# Patient Record
Sex: FEMALE | Race: BLACK OR AFRICAN AMERICAN | Employment: FULL TIME | URBAN - METROPOLITAN AREA
[De-identification: names, ages, dates, MRNs, and addresses within clinical notes are randomized per-mention and may not be internally consistent; named-entity substitution may affect disease eponyms.]

---

## 2021-04-28 ENCOUNTER — HOSPITAL ENCOUNTER (EMERGENCY)
Age: 41
Discharge: HOME | End: 2021-04-28
Attending: EMERGENCY MEDICINE
Payer: COMMERCIAL

## 2021-04-28 VITALS
HEART RATE: 78 BPM | WEIGHT: 218 LBS | OXYGEN SATURATION: 99 % | SYSTOLIC BLOOD PRESSURE: 127 MMHG | TEMPERATURE: 97.6 F | BODY MASS INDEX: 41.16 KG/M2 | HEIGHT: 61 IN | DIASTOLIC BLOOD PRESSURE: 79 MMHG | RESPIRATION RATE: 17 BRPM

## 2021-04-28 DIAGNOSIS — M62.838 TRAPEZIUS MUSCLE SPASM: Primary | ICD-10-CM

## 2021-04-28 PROCEDURE — 99283 EMERGENCY DEPT VISIT LOW MDM: CPT

## 2021-04-28 PROCEDURE — 74011636637 HC RX REV CODE- 636/637: Performed by: EMERGENCY MEDICINE

## 2021-04-28 PROCEDURE — 74011250637 HC RX REV CODE- 250/637: Performed by: EMERGENCY MEDICINE

## 2021-04-28 RX ORDER — HYDROCODONE BITARTRATE AND ACETAMINOPHEN 5; 325 MG/1; MG/1
1 TABLET ORAL
Qty: 7 TAB | Refills: 0 | Status: SHIPPED | OUTPATIENT
Start: 2021-04-28 | End: 2021-05-05

## 2021-04-28 RX ORDER — PREDNISONE 20 MG/1
TABLET ORAL
Qty: 21 TAB | Refills: 0 | Status: SHIPPED | OUTPATIENT
Start: 2021-04-28

## 2021-04-28 RX ORDER — PREDNISONE 20 MG/1
60 TABLET ORAL ONCE
Status: COMPLETED | OUTPATIENT
Start: 2021-04-28 | End: 2021-04-28

## 2021-04-28 RX ORDER — PREDNISONE 20 MG/1
20 TABLET ORAL ONCE
Status: DISCONTINUED | OUTPATIENT
Start: 2021-04-28 | End: 2021-04-28 | Stop reason: HOSPADM

## 2021-04-28 RX ORDER — KETOROLAC TROMETHAMINE 10 MG/1
10 TABLET, FILM COATED ORAL
Qty: 20 TAB | Refills: 0 | Status: SHIPPED | OUTPATIENT
Start: 2021-04-28 | End: 2021-05-03

## 2021-04-28 RX ORDER — TIZANIDINE HYDROCHLORIDE 2 MG/1
2 CAPSULE, GELATIN COATED ORAL
Qty: 21 CAP | Refills: 0 | Status: SHIPPED | OUTPATIENT
Start: 2021-04-28

## 2021-04-28 RX ORDER — TIZANIDINE 2 MG/1
2 TABLET ORAL
Status: COMPLETED | OUTPATIENT
Start: 2021-04-28 | End: 2021-04-28

## 2021-04-28 RX ADMIN — TIZANIDINE 2 MG: 2 TABLET ORAL at 08:00

## 2021-04-28 RX ADMIN — PREDNISONE 60 MG: 20 TABLET ORAL at 08:01

## 2021-04-28 NOTE — DISCHARGE INSTRUCTIONS
Take Zanaflex for trapezius muscle spasm  Take steroid tablet as directed to help reduce inflammation  Take Norco at night for pain for sleep  Take Toradol for pain as anti-inflammatory

## 2021-04-28 NOTE — ED PROVIDER NOTES
EMERGENCY DEPARTMENT HISTORY AND PHYSICAL EXAM    Date: 4/28/2021  Patient Name: Maycol Wills    History of Presenting Illness     Chief Complaint   Patient presents with    Shoulder Pain    Neck Pain         History Provided By: Patient    Maycol Wills is a 47 y.o. female who presents to the emergency department C/O neck and upper back pain. Patient states the pain is been worsening over the last 2 weeks. States that it feels like her whole neck and upper back is extremely tight and has a hard time moving. She denies any falls or trauma. She states she did go see a physical therapist who provided her with some exercises to do and has been taking ibuprofen but does not seem to be helping. She denies any radiation of the pain down her arms, numbness tingling or weakness. Denies any headache or fever. She localized the pain to her neck, upper back and between her shoulder blades radiating through to her shoulders. Delicia Gonzalez PCP: No primary care provider on file. Current Facility-Administered Medications   Medication Dose Route Frequency Provider Last Rate Last Admin    tiZANidine (ZANAFLEX) tablet 2 mg  2 mg Oral NOW Jose Fuentes DO        predniSONE (DELTASONE) tablet 60 mg  60 mg Oral ONCE Jose Fuentes, DO         Current Outpatient Medications   Medication Sig Dispense Refill    tiZANidine (Zanaflex) 2 mg capsule Take 1 Cap by mouth three (3) times daily as needed (muscle spasm). 21 Cap 0    predniSONE (DELTASONE) 20 mg tablet Take 3 pills for 2 days, 2.5 pills for 2 days, 2 pills for 2 days, 1.5 pills for 2 days, 1 pills for 2 days, 1/2 pill for 2 days 21 Tab 0    HYDROcodone-acetaminophen (Norco) 5-325 mg per tablet Take 1 Tab by mouth nightly for 7 days. Max Daily Amount: 1 Tab. 7 Tab 0    ketorolac (TORADOL) 10 mg tablet Take 1 Tab by mouth every six (6) hours as needed for Pain for up to 5 days.  20 Tab 0       Past History     Past Medical History:  Past Medical History: Diagnosis Date    Ill-defined condition        Past Surgical History:  History reviewed. No pertinent surgical history. Family History:  History reviewed. No pertinent family history. Social History:  Social History     Tobacco Use    Smoking status: Never Smoker    Smokeless tobacco: Never Used   Substance Use Topics    Alcohol use: Yes     Alcohol/week: 4.0 standard drinks     Types: 4 Glasses of wine per week    Drug use: Not Currently       Allergies:  No Known Allergies      Review of Systems   Review of Systems   Constitutional: Negative for fever. Cardiovascular: Negative for chest pain. Gastrointestinal: Negative for abdominal pain. Musculoskeletal: Positive for back pain, myalgias and neck pain. Neurological: Negative for dizziness and headaches. All other systems reviewed and are negative. All other systems reviewed and are negative.     Physical Exam     Vitals:    04/28/21 0728 04/28/21 0733   BP:  127/79   Pulse:  78   Resp:  17   Temp:  97.6 °F (36.4 °C)   SpO2:  99%   Weight: 98.9 kg (218 lb)    Height: 5' 1\" (1.549 m)      Physical Exam    Nursing notes and vital signs reviewed    Airway: intact, speaking normally  Breathing: No apparent distress, no cyanosis  Circulation: Peripheral pulses equal    Constitutional: Non toxic appearing, uncomfortable appearing but no acute distress, appearing stated age  Head: Normocephalic, Atraumatic  Eyes: PERRL, EOMI, No conjunctival injection  Ears: external ears normal  Nose: No rhinorrhea, external nose normal  Throat: mucous membranes moist  Neck: symmetric, trachea midline, no obvious swelling, no JVD, no obvious deformity  Cardiovascular: Regular rate and rhythm, no murmurs  Chest: No palpable tenderness, structural deformity or crepitus  Lungs: Clear to ausculation bilaterally, No stridor, Normal work of breathing and chest excursion bilaterally  Abdomen: Soft, non tender, non distended, normoactive bowel sounds, No rigidity, no peritoneal signs  Musculoskeletal: Patient sitting and standing in an upright position. She has palpable muscle straining and tightness to her entire trapezius muscles bilaterally. Limited range of motion to her neck secondary to pain. She is distally neurovascularly intact. No midline tenderness no evidence of obvious deformity to the back, extremities, no LE edema  Skin: Warm, dry, No obvious rashes  Neuro: Alert and oriented x 3, CN 2-12 intact, normal speech, strength and sensation full and symmetric bilaterally  Psychiatric: Normal mood and affect      Diagnostic Study Results     Labs -   No results found for this or any previous visit (from the past 72 hour(s)). Radiologic Studies -   No orders to display     CT Results  (Last 48 hours)    None        CXR Results  (Last 48 hours)    None          Medications given in the ED-  Medications   tiZANidine (ZANAFLEX) tablet 2 mg (has no administration in time range)   predniSONE (DELTASONE) tablet 60 mg (has no administration in time range)         Medical Decision Making     I reviewed the vital signs, available nursing notes, past medical history, past surgical history, family history and social history. Vital Signs interpretation- I have reviewed the patient's vital signs. Pulse Oximetry interpretation - 100% on Room air     Cardiac Monitor interpretation:  Rate: 78 bpm  Rhythm: sinus      Records Reviewed: Nursing Notes and Old Medical Records    Procedures:  Procedures    ED Course & MDM:   Patient given Zanaflex and prednisone. I discussed with the patient that her symptoms appear most likely as a trapezius muscle spasm. Recommended prescribed medications as directed including pain medication as directed, steroids and muscle relaxants.   Recommended specific exercises directed and follow-up with her primary care doctor and physical therapist.  Recommended come back to emergency department she develops worsening of her symptoms including but not limited to numbness or weakness in her extremities. She understands and agrees to plan    Diagnosis and Disposition         DISCHARGE NOTE:    Sissy Rawls's  results have been reviewed with her. She has been counseled regarding her diagnosis, treatment, and plan. She verbally conveys understanding and agreement of the signs, symptoms, diagnosis, treatment and prognosis and additionally agrees to follow up as discussed. She also agrees with the care-plan and conveys that all of her questions have been answered. I have also provided discharge instructions for her that include: educational information regarding their diagnosis and treatment, and list of reasons why they would want to return to the ED prior to their follow-up appointment, should her condition change. She has been provided with education for proper emergency department utilization. CLINICAL IMPRESSION:    1. Trapezius muscle spasm        PLAN:  1. D/C Home  2. Current Discharge Medication List      START taking these medications    Details   tiZANidine (Zanaflex) 2 mg capsule Take 1 Cap by mouth three (3) times daily as needed (muscle spasm). Qty: 21 Cap, Refills: 0      predniSONE (DELTASONE) 20 mg tablet Take 3 pills for 2 days, 2.5 pills for 2 days, 2 pills for 2 days, 1.5 pills for 2 days, 1 pills for 2 days, 1/2 pill for 2 days  Qty: 21 Tab, Refills: 0      HYDROcodone-acetaminophen (Norco) 5-325 mg per tablet Take 1 Tab by mouth nightly for 7 days. Max Daily Amount: 1 Tab. Qty: 7 Tab, Refills: 0    Associated Diagnoses: Trapezius muscle spasm      ketorolac (TORADOL) 10 mg tablet Take 1 Tab by mouth every six (6) hours as needed for Pain for up to 5 days. Qty: 20 Tab, Refills: 0           3.    Follow-up Information     Follow up With Specialties Details Why Contact Info    Your physical therapist            _______________________________      Please note that this dictation was completed with [employer], the computer voice recognition software. Quite often unanticipated grammatical, syntax, homophones, and other interpretive errors are inadvertently transcribed by the computer software. Please disregard these errors. Please excuse any errors that have escaped final proofreading.

## 2021-04-28 NOTE — ED NOTES
Assumed care for d/c only. I have reviewed discharge instructions with the patient. The patient verbalized understanding. arm band placed in shred it

## 2021-04-28 NOTE — ED TRIAGE NOTES
Pt here for complaints of neck/shoulder pain for past 2 weeks, has gotten progressively worse this morning.